# Patient Record
Sex: MALE | Race: WHITE | Employment: STUDENT | URBAN - METROPOLITAN AREA
[De-identification: names, ages, dates, MRNs, and addresses within clinical notes are randomized per-mention and may not be internally consistent; named-entity substitution may affect disease eponyms.]

---

## 2017-05-11 ENCOUNTER — OFFICE VISIT (OUTPATIENT)
Dept: INTERNAL MEDICINE CLINIC | Facility: CLINIC | Age: 22
End: 2017-05-11

## 2017-05-11 VITALS
RESPIRATION RATE: 16 BRPM | TEMPERATURE: 98 F | DIASTOLIC BLOOD PRESSURE: 72 MMHG | HEIGHT: 72 IN | HEART RATE: 88 BPM | BODY MASS INDEX: 21.54 KG/M2 | WEIGHT: 159 LBS | SYSTOLIC BLOOD PRESSURE: 118 MMHG

## 2017-05-11 DIAGNOSIS — J06.9 ACUTE URI: Primary | ICD-10-CM

## 2017-05-11 PROCEDURE — 99213 OFFICE O/P EST LOW 20 MIN: CPT | Performed by: INTERNAL MEDICINE

## 2017-05-11 PROCEDURE — 99202 OFFICE O/P NEW SF 15 MIN: CPT | Performed by: INTERNAL MEDICINE

## 2017-05-11 PROCEDURE — 87880 STREP A ASSAY W/OPTIC: CPT | Performed by: INTERNAL MEDICINE

## 2017-05-11 NOTE — PATIENT INSTRUCTIONS
Your Strep test today was negative. Please treat symptoms with Tylenol, warm salt water gargles and Robitussin as needed. Call if not soon better.

## 2017-05-11 NOTE — PROGRESS NOTES
Nicole Mcdowell is a 25year old male. Patient presents with:  Sore Throat: headache, fatique    HPI:   For the past 2 days, he has had symptoms of fatigue, sore throat and cough productive of yellow sputum. No fever.   No nasal congestion, purulent nasal

## 2017-05-22 ENCOUNTER — OFFICE VISIT (OUTPATIENT)
Dept: INTERNAL MEDICINE CLINIC | Facility: CLINIC | Age: 22
End: 2017-05-22

## 2017-05-22 ENCOUNTER — TELEPHONE (OUTPATIENT)
Dept: INTERNAL MEDICINE CLINIC | Facility: CLINIC | Age: 22
End: 2017-05-22

## 2017-05-22 VITALS
BODY MASS INDEX: 23.43 KG/M2 | HEIGHT: 72 IN | HEART RATE: 76 BPM | WEIGHT: 173 LBS | DIASTOLIC BLOOD PRESSURE: 69 MMHG | OXYGEN SATURATION: 97 % | SYSTOLIC BLOOD PRESSURE: 113 MMHG | TEMPERATURE: 98 F

## 2017-05-22 DIAGNOSIS — J06.9 ACUTE URI: Primary | ICD-10-CM

## 2017-05-22 PROCEDURE — 99213 OFFICE O/P EST LOW 20 MIN: CPT | Performed by: INTERNAL MEDICINE

## 2017-05-22 PROCEDURE — 99212 OFFICE O/P EST SF 10 MIN: CPT | Performed by: INTERNAL MEDICINE

## 2017-05-22 RX ORDER — AZITHROMYCIN 250 MG/1
TABLET, FILM COATED ORAL
Qty: 6 TABLET | Refills: 0 | Status: SHIPPED | OUTPATIENT
Start: 2017-05-22 | End: 2017-05-30 | Stop reason: ALTCHOICE

## 2017-05-22 NOTE — TELEPHONE ENCOUNTER
Pt states still having symptoms cough, headaches, congestion, and ear discomfort from visit 5/11/2017  Pt has appt with Dr. Shea Luna 5/22 at 3:20 pm.  Please advise.

## 2017-05-22 NOTE — TELEPHONE ENCOUNTER
Actions Requested: Appt scheduled for this afternoon. Situation/Background   Problem: Cold symptoms continue   Onset: 2 weeks   Associated Symptoms: Cough, H/A, muffled hearing, ear pain, yellow/green with flecks of green to nasal drainage and sputum.  Christina Jimenez stroke, chronic illness, recovering from surgery)  * Fever present > 3 days (72 hours)  * Fever returns after gone for over 24 hours and symptoms worse or not improved  * Sinus pain (not just congestion) and fever    Care Advice Discussed:  * Reassurance

## 2017-05-22 NOTE — PROGRESS NOTES
Max Aguilar is a 25year old male.  Patient presents with:  Cough  Sinus Problem    HPI:   Since his visit here May 11, he has ongoing symptoms of nasal congestion with yellow-green drainage, sinus pressure, bilateral ear pressure with occasional pain,

## 2017-05-22 NOTE — PATIENT INSTRUCTIONS
Please take Zithromax daily for 5 days. Treat symptoms with pseudoephedrine and Robitussin as needed. Call if not soon better.

## 2017-05-30 ENCOUNTER — OFFICE VISIT (OUTPATIENT)
Dept: INTERNAL MEDICINE CLINIC | Facility: CLINIC | Age: 22
End: 2017-05-30

## 2017-05-30 VITALS
BODY MASS INDEX: 23.57 KG/M2 | WEIGHT: 174 LBS | TEMPERATURE: 98 F | HEIGHT: 72 IN | OXYGEN SATURATION: 98 % | SYSTOLIC BLOOD PRESSURE: 104 MMHG | HEART RATE: 54 BPM | DIASTOLIC BLOOD PRESSURE: 66 MMHG

## 2017-05-30 DIAGNOSIS — J01.10 ACUTE NON-RECURRENT FRONTAL SINUSITIS: Primary | ICD-10-CM

## 2017-05-30 DIAGNOSIS — R05.8 POST-VIRAL COUGH SYNDROME: ICD-10-CM

## 2017-05-30 PROCEDURE — 99214 OFFICE O/P EST MOD 30 MIN: CPT | Performed by: INTERNAL MEDICINE

## 2017-05-30 PROCEDURE — 99212 OFFICE O/P EST SF 10 MIN: CPT | Performed by: INTERNAL MEDICINE

## 2017-05-30 RX ORDER — BENZONATATE 100 MG/1
100 CAPSULE ORAL 2 TIMES DAILY PRN
Qty: 10 CAPSULE | Refills: 0 | Status: SHIPPED | OUTPATIENT
Start: 2017-05-30 | End: 2019-01-15

## 2017-05-30 RX ORDER — AMOXICILLIN 875 MG/1
875 TABLET, COATED ORAL 2 TIMES DAILY
Qty: 14 TABLET | Refills: 0 | Status: SHIPPED | OUTPATIENT
Start: 2017-05-30 | End: 2017-06-06

## 2017-05-30 NOTE — PROGRESS NOTES
Modesto Messina is a 25year old male. Patient presents with:  Cough: Patient was recently seen due to cough, stuffy nose, loss of voice. Had Antibiotics, finished Friday. Still having cough with phlegm (green. yellow)  Stuffy Nose  Ear Problem: Patient de (36.7 °C) (Oral)  Ht 6' (1.829 m)  Wt 174 lb (78.926 kg)  BMI 23.59 kg/m2  SpO2 98%  GENERAL: well developed, well nourished,in no apparent distress, nasal speech   SKIN: no rashes,no suspicious lesions  HEENT: atraumatic, normocephalic,ears L>R TM red and

## 2017-07-26 ENCOUNTER — TELEPHONE (OUTPATIENT)
Dept: OPTOMETRY | Facility: CLINIC | Age: 22
End: 2017-07-26

## 2017-07-26 NOTE — TELEPHONE ENCOUNTER
Pt states he needs appt prior to 8/28 or if it is possible to extend eye contact rx, states he feels very comfortable with current rx. Pls call thank you.

## 2017-12-21 ENCOUNTER — OFFICE VISIT (OUTPATIENT)
Dept: OPTOMETRY | Facility: CLINIC | Age: 22
End: 2017-12-21

## 2017-12-21 DIAGNOSIS — H52.13 MYOPIA OF BOTH EYES: Primary | ICD-10-CM

## 2017-12-21 PROCEDURE — 92012 INTRM OPH EXAM EST PATIENT: CPT | Performed by: OPTOMETRIST

## 2017-12-21 PROCEDURE — 92015 DETERMINE REFRACTIVE STATE: CPT | Performed by: OPTOMETRIST

## 2017-12-21 NOTE — PROGRESS NOTES
Kat Encinas is a 23year old male. HPI:     HPI     Patient is in for a annual contact lens exam. Patient is happy with his Focus Dailies and sees well.      Last edited by Nuvia Campoverde OD on 12/21/2017  5:00 PM. (History)        Patient History:  Pa Lids/Lashes Normal Normal    Conjunctiva/Sclera Normal Normal    Cornea Clear Clear    Anterior Chamber Deep and quiet Deep and quiet    Iris Normal Normal    Lens Clear Clear          Fundus Exam       Right Left    Disc Normal Normal    C/D Ratio 0.25 0.

## 2017-12-21 NOTE — PROGRESS NOTES
Zahra Martinez is a 23year old male. HPI:     HPI     Patient is in for a annual contact lens exam. Patient is happy with his Focus Dailies and sees well.      Last edited by Amy Alvarez OD on 12/21/2017  5:00 PM. (History)        Patient History:  Pa Lids/Lashes Normal Normal    Conjunctiva/Sclera Normal Normal    Cornea Clear Clear    Anterior Chamber Deep and quiet Deep and quiet    Iris Normal Normal    Lens Clear Clear          Fundus Exam       Right Left    Disc Normal Normal    C/D Ratio 0.25 0.

## 2019-01-15 PROCEDURE — 87591 N.GONORRHOEAE DNA AMP PROB: CPT | Performed by: INTERNAL MEDICINE

## 2019-01-15 PROCEDURE — 87389 HIV-1 AG W/HIV-1&-2 AB AG IA: CPT | Performed by: INTERNAL MEDICINE

## 2019-01-15 PROCEDURE — 87491 CHLMYD TRACH DNA AMP PROBE: CPT | Performed by: INTERNAL MEDICINE

## 2019-01-15 PROCEDURE — 36415 COLL VENOUS BLD VENIPUNCTURE: CPT | Performed by: INTERNAL MEDICINE

## 2019-01-15 PROCEDURE — 86780 TREPONEMA PALLIDUM: CPT | Performed by: INTERNAL MEDICINE

## 2019-01-25 ENCOUNTER — OFFICE VISIT (OUTPATIENT)
Dept: OPTOMETRY | Facility: CLINIC | Age: 24
End: 2019-01-25
Payer: COMMERCIAL

## 2019-01-25 DIAGNOSIS — H52.13 MYOPIA OF BOTH EYES: Primary | ICD-10-CM

## 2019-01-25 PROCEDURE — 92015 DETERMINE REFRACTIVE STATE: CPT | Performed by: OPTOMETRIST

## 2019-01-25 PROCEDURE — 92012 INTRM OPH EXAM EST PATIENT: CPT | Performed by: OPTOMETRIST

## 2019-01-25 NOTE — PROGRESS NOTES
Allyn Manriquez is a 26year old male. HPI:     HPI     Patient is in for an annual contact lens exam. He is happy with his current contact Focus Dailies but is going [place] and wants to  Make sure he has enough lenses.     Last edited by [employer], Exam       Right Left    Lids/Lashes Normal Normal    Conjunctiva/Sclera Normal Normal    Cornea Clear Clear    Anterior Chamber Deep and quiet Deep and quiet    Iris Normal Normal    Lens Clear Clear          Fundus Exam       Right Left    Disc Normal No

## 2019-08-14 ENCOUNTER — TELEPHONE (OUTPATIENT)
Dept: OPTOMETRY | Facility: CLINIC | Age: 24
End: 2019-08-14

## 2019-08-14 NOTE — TELEPHONE ENCOUNTER
Pt asking for contact rx to be faxed to [facility] at [phone], also states he was told he did not need EE until 01/2021 wants to make sure that is the expiration date on prescription.

## 2019-08-15 ENCOUNTER — TELEPHONE (OUTPATIENT)
Dept: OPTOMETRY | Facility: CLINIC | Age: 24
End: 2019-08-15

## 2020-12-08 ENCOUNTER — TELEPHONE (OUTPATIENT)
Dept: OPTOMETRY | Facility: CLINIC | Age: 25
End: 2020-12-08

## 2020-12-08 NOTE — TELEPHONE ENCOUNTER
Per pt contact prescription expires next month, pt asking if his prescription can be extended? Please call thank you.

## 2021-02-08 ENCOUNTER — OFFICE VISIT (OUTPATIENT)
Dept: OPTOMETRY | Facility: CLINIC | Age: 26
End: 2021-02-08
Payer: COMMERCIAL

## 2021-02-08 DIAGNOSIS — H52.13 MYOPIA OF BOTH EYES: Primary | ICD-10-CM

## 2021-02-08 PROCEDURE — 92015 DETERMINE REFRACTIVE STATE: CPT | Performed by: OPTOMETRIST

## 2021-02-08 PROCEDURE — 92012 INTRM OPH EXAM EST PATIENT: CPT | Performed by: OPTOMETRIST

## 2021-02-08 NOTE — PROGRESS NOTES
Stuart Turcios is a 23year old male. HPI:     HPI     Patient is in for an annual contact lens exam. Patient is happy with his Focus Dailies. Patient does not want to be dilated today.     Last edited by Doris Orona, POLLY on 2/8/2021 10:42 AM. (History) Slit Lamp and Fundus Exam     External Exam       Right Left    External Normal Normal          Slit Lamp Exam       Right Left    Lids/Lashes Normal Normal    Conjunctiva/Sclera Normal Normal    Cornea Clear Clear    Anterior Chamber Deep and quiet Deep

## 2021-05-06 ENCOUNTER — IMMUNIZATION (OUTPATIENT)
Dept: LAB | Age: 26
End: 2021-05-06

## 2021-05-06 DIAGNOSIS — Z23 NEED FOR VACCINATION: Primary | ICD-10-CM

## 2021-05-06 PROCEDURE — 91300 COVID 19 PFIZER-BIONTECH: CPT | Performed by: HOSPITALIST

## 2021-05-06 PROCEDURE — 0001A COVID 19 PFIZER-BIONTECH: CPT | Performed by: HOSPITALIST

## (undated) NOTE — MR AVS SNAPSHOT
[facility] - [place] [address] [place]  [address]  [phone]               Thank you for choosing us for your health care visit with Ramila Nunez MD.  We are glad to serve you and happy to provide you with this summary of yo - benzonatate 100 MG Caps            MyChart     Sign up for [employer], your secure online medical record. [employer] will allow you to access patient instructions from your recent visit,  view other health information, and more.  To sign up or find more inform